# Patient Record
Sex: FEMALE
[De-identification: names, ages, dates, MRNs, and addresses within clinical notes are randomized per-mention and may not be internally consistent; named-entity substitution may affect disease eponyms.]

---

## 2017-12-23 ENCOUNTER — TRANSCRIPTION ENCOUNTER (OUTPATIENT)
Age: 67
End: 2017-12-23

## 2020-11-23 ENCOUNTER — APPOINTMENT (OUTPATIENT)
Dept: OBGYN | Facility: CLINIC | Age: 70
End: 2020-11-23
Payer: COMMERCIAL

## 2020-11-23 VITALS
DIASTOLIC BLOOD PRESSURE: 82 MMHG | TEMPERATURE: 98.9 F | WEIGHT: 175 LBS | BODY MASS INDEX: 25.05 KG/M2 | SYSTOLIC BLOOD PRESSURE: 160 MMHG | HEIGHT: 70 IN

## 2020-11-23 DIAGNOSIS — Z63.5 DISRUPTION OF FAMILY BY SEPARATION AND DIVORCE: ICD-10-CM

## 2020-11-23 DIAGNOSIS — Z87.09 PERSONAL HISTORY OF OTHER DISEASES OF THE RESPIRATORY SYSTEM: ICD-10-CM

## 2020-11-23 DIAGNOSIS — Z72.0 TOBACCO USE: ICD-10-CM

## 2020-11-23 DIAGNOSIS — Z86.39 PERSONAL HISTORY OF OTHER ENDOCRINE, NUTRITIONAL AND METABOLIC DISEASE: ICD-10-CM

## 2020-11-23 DIAGNOSIS — Z01.419 ENCOUNTER FOR GYNECOLOGICAL EXAMINATION (GENERAL) (ROUTINE) W/OUT ABNORMAL FINDINGS: ICD-10-CM

## 2020-11-23 DIAGNOSIS — Z87.39 PERSONAL HISTORY OF OTHER DISEASES OF THE MUSCULOSKELETAL SYSTEM AND CONNECTIVE TISSUE: ICD-10-CM

## 2020-11-23 DIAGNOSIS — Z86.59 PERSONAL HISTORY OF OTHER MENTAL AND BEHAVIORAL DISORDERS: ICD-10-CM

## 2020-11-23 DIAGNOSIS — Z72.89 OTHER PROBLEMS RELATED TO LIFESTYLE: ICD-10-CM

## 2020-11-23 DIAGNOSIS — Z87.891 PERSONAL HISTORY OF NICOTINE DEPENDENCE: ICD-10-CM

## 2020-11-23 DIAGNOSIS — Z12.31 ENCOUNTER FOR SCREENING MAMMOGRAM FOR MALIGNANT NEOPLASM OF BREAST: ICD-10-CM

## 2020-11-23 PROBLEM — Z00.00 ENCOUNTER FOR PREVENTIVE HEALTH EXAMINATION: Status: ACTIVE | Noted: 2020-11-23

## 2020-11-23 PROCEDURE — 99387 INIT PM E/M NEW PAT 65+ YRS: CPT

## 2020-11-23 RX ORDER — FLUOXETINE HYDROCHLORIDE 20 MG/1
20 CAPSULE ORAL
Qty: 270 | Refills: 0 | Status: COMPLETED | COMMUNITY
Start: 2020-10-20 | End: 2020-11-23

## 2020-11-23 RX ORDER — AMOXICILLIN AND CLAVULANATE POTASSIUM 875; 125 MG/1; MG/1
875-125 TABLET, COATED ORAL
Qty: 20 | Refills: 0 | Status: COMPLETED | COMMUNITY
Start: 2020-05-28 | End: 2020-11-23

## 2020-11-23 RX ORDER — LEVOTHYROXINE SODIUM 0.09 MG/1
88 TABLET ORAL
Refills: 0 | Status: ACTIVE | COMMUNITY

## 2020-11-23 RX ORDER — ROSUVASTATIN CALCIUM 10 MG/1
10 TABLET, FILM COATED ORAL
Qty: 90 | Refills: 0 | Status: ACTIVE | COMMUNITY
Start: 2020-11-21

## 2020-11-23 RX ORDER — FLUOXETINE HYDROCHLORIDE 40 MG/1
40 CAPSULE ORAL
Qty: 90 | Refills: 0 | Status: COMPLETED | COMMUNITY
Start: 2020-07-27 | End: 2020-11-23

## 2020-11-23 RX ORDER — FLUOXETINE HYDROCHLORIDE 20 MG/1
20 CAPSULE ORAL DAILY
Refills: 0 | Status: ACTIVE | COMMUNITY

## 2020-11-23 RX ORDER — LEVOTHYROXINE SODIUM 0.09 MG/1
88 TABLET ORAL
Qty: 90 | Refills: 0 | Status: COMPLETED | COMMUNITY
Start: 2020-09-17 | End: 2020-11-23

## 2020-11-23 RX ORDER — HYDROXYZINE HYDROCHLORIDE 25 MG/1
25 TABLET ORAL
Qty: 60 | Refills: 0 | Status: ACTIVE | COMMUNITY
Start: 2020-09-02

## 2020-11-23 RX ORDER — DOXYCYCLINE HYCLATE 100 MG/1
100 CAPSULE ORAL
Qty: 20 | Refills: 0 | Status: COMPLETED | COMMUNITY
Start: 2020-05-28 | End: 2020-11-23

## 2020-11-23 RX ORDER — MELOXICAM 15 MG/1
15 TABLET ORAL
Qty: 30 | Refills: 0 | Status: ACTIVE | COMMUNITY
Start: 2020-08-02

## 2020-11-23 RX ORDER — FLUTICASONE PROPIONATE AND SALMETEROL 50; 500 UG/1; UG/1
500-50 POWDER RESPIRATORY (INHALATION)
Refills: 0 | Status: ACTIVE | COMMUNITY

## 2020-11-23 RX ORDER — ESOMEPRAZOLE MAGNESIUM 20 MG/1
20 CAPSULE, DELAYED RELEASE ORAL
Refills: 0 | Status: ACTIVE | COMMUNITY

## 2020-11-23 SDOH — SOCIAL STABILITY - SOCIAL INSECURITY: DISRUPTION OF FAMILY BY SEPARATION AND DIVORCE: Z63.5

## 2020-11-23 NOTE — HISTORY OF PRESENT ILLNESS
[FreeTextEntry1] : 71 yo G3 P 2012 with LMP 50 for new gyn.\par Menstrual triad: 12 x 28 x 5\par OCP less than one year. \par \par OB:\par  7.6 and 7.7 #\par TOP with BTL following\par h/o alcoholism with recovery in \par \par Prempro for 9 yrs post menopause [PapSmeardate] : 2018 [TextBox_31] : Dr Pinon [BoneDensityDate] : yes [ColonoscopyDate] : 2016

## 2020-11-23 NOTE — DISCUSSION/SUMMARY
[FreeTextEntry1] : Health Maintenance:\par pap with HPV\par TBSE\par Mammo Rx\par coloncancer screen\par Vit D 1000 IUs daily, calcium of 1100mg daily thru diet of dark green leafy vegetables, low-fat milk products and exercise TIW.\par \par Tobacco abuse:\par 1.5 PPD\par let go from [employer]\par \par H/O Alcohol abuse\par -quit 1989

## 2020-12-23 PROBLEM — Z01.419 ENCOUNTER FOR GYNECOLOGICAL EXAMINATION WITH PAPANICOLAOU SMEAR OF CERVIX: Status: RESOLVED | Noted: 2020-11-23 | Resolved: 2020-12-23

## 2023-01-10 ENCOUNTER — OFFICE (OUTPATIENT)
Dept: URBAN - METROPOLITAN AREA CLINIC 103 | Facility: CLINIC | Age: 73
Setting detail: OPHTHALMOLOGY
End: 2023-01-10
Payer: COMMERCIAL

## 2023-01-10 DIAGNOSIS — H10.433: ICD-10-CM

## 2023-01-10 DIAGNOSIS — G43.009: ICD-10-CM

## 2023-01-10 DIAGNOSIS — H52.7: ICD-10-CM

## 2023-01-10 PROCEDURE — 92015 DETERMINE REFRACTIVE STATE: CPT | Performed by: OPHTHALMOLOGY

## 2023-01-10 PROCEDURE — 92014 COMPRE OPH EXAM EST PT 1/>: CPT | Performed by: OPHTHALMOLOGY

## 2023-01-10 ASSESSMENT — REFRACTION_MANIFEST
OS_CYLINDER: -0.75
OD_VA1: 20/20
OD_SPHERE: +1.50
OS_ADD: +2.25
OS_SPHERE: +1.00
OS_VA1: 20/20-
OS_SPHERE: +1.00
OD_ADD: +2.50
OD_AXIS: 109
OS_AXIS: 075
OD_ADD: +2.25
OS_VA1: 20/20-1
OD_VA1: 20/20-
OD_CYLINDER: -1.25
OS_AXIS: 075
OS_ADD: +2.50
OD_SPHERE: +1.50
OS_CYLINDER: -1.00
OD_AXIS: 105
OD_CYLINDER: -1.00

## 2023-01-10 ASSESSMENT — VISUAL ACUITY
OS_BCVA: 20/25
OD_BCVA: 20/25

## 2023-01-10 ASSESSMENT — REFRACTION_CURRENTRX
OD_SPHERE: +1.00
OS_OVR_VA: 20/
OD_ADD: +2.25
OS_SPHERE: +0.75
OD_VPRISM_DIRECTION: PROGS
OS_VPRISM_DIRECTION: PROGS
OD_CYLINDER: -0.50
OD_AXIS: 103
OD_OVR_VA: 20/
OS_CYLINDER: -0.50
OS_AXIS: 079
OS_ADD: +2.25

## 2023-01-10 ASSESSMENT — KERATOMETRY
OD_K2POWER_DIOPTERS: 42.25
OD_AXISANGLE_DEGREES: 173
OS_K2POWER_DIOPTERS: 43.00
OS_AXISANGLE_DEGREES: 159
OD_K1POWER_DIOPTERS: 41.50
OS_K1POWER_DIOPTERS: 42.50

## 2023-01-10 ASSESSMENT — AXIALLENGTH_DERIVED
OS_AL: 23.6729
OS_AL: 23.6729
OD_AL: 23.7515
OD_AL: 23.801
OD_AL: 23.8507
OS_AL: 23.6239

## 2023-01-10 ASSESSMENT — TONOMETRY
OD_IOP_MMHG: 17
OS_IOP_MMHG: 16

## 2023-01-10 ASSESSMENT — SPHEQUIV_DERIVED
OD_SPHEQUIV: 1
OS_SPHEQUIV: 0.5
OD_SPHEQUIV: 0.875
OD_SPHEQUIV: 1.125
OS_SPHEQUIV: 0.5
OS_SPHEQUIV: 0.625

## 2023-01-10 ASSESSMENT — CONFRONTATIONAL VISUAL FIELD TEST (CVF)
OS_FINDINGS: FULL
OD_FINDINGS: FULL

## 2023-01-10 ASSESSMENT — REFRACTION_AUTOREFRACTION
OD_AXIS: 109
OD_CYLINDER: -1.25
OS_SPHERE: +1.00
OD_SPHERE: +1.75
OS_CYLINDER: -1.00
OS_AXIS: 077

## 2024-12-25 PROBLEM — F10.90 ALCOHOL USE: Status: ACTIVE | Noted: 2020-11-23

## 2025-05-07 ENCOUNTER — OFFICE (OUTPATIENT)
Dept: URBAN - METROPOLITAN AREA CLINIC 103 | Facility: CLINIC | Age: 75
Setting detail: OPHTHALMOLOGY
End: 2025-05-07
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
## 2025-05-07 PROCEDURE — NO SHOW FE NO SHOW FEE: Performed by: OPHTHALMOLOGY

## 2025-06-18 ENCOUNTER — APPOINTMENT (OUTPATIENT)
Dept: VASCULAR SURGERY | Facility: CLINIC | Age: 75
End: 2025-06-18

## 2025-08-21 ENCOUNTER — APPOINTMENT (OUTPATIENT)
Age: 75
End: 2025-08-21

## 2025-08-21 PROCEDURE — ZZZZZ: CPT

## 2025-09-11 ENCOUNTER — APPOINTMENT (OUTPATIENT)
Age: 75
End: 2025-09-11